# Patient Record
Sex: FEMALE | URBAN - METROPOLITAN AREA
[De-identification: names, ages, dates, MRNs, and addresses within clinical notes are randomized per-mention and may not be internally consistent; named-entity substitution may affect disease eponyms.]

---

## 2022-03-30 ENCOUNTER — OFFICE VISIT (OUTPATIENT)
Dept: URBAN - METROPOLITAN AREA CLINIC 56 | Facility: CLINIC | Age: 62
End: 2022-03-30
Payer: COMMERCIAL

## 2022-03-30 DIAGNOSIS — L30.9 DERMATITIS, UNSPECIFIED: Primary | ICD-10-CM

## 2022-03-30 DIAGNOSIS — H43.812 VITREOUS DEGENERATION, LEFT EYE: ICD-10-CM

## 2022-03-30 DIAGNOSIS — H25.13 AGE-RELATED NUCLEAR CATARACT, BILATERAL: ICD-10-CM

## 2022-03-30 PROCEDURE — 99204 OFFICE O/P NEW MOD 45 MIN: CPT | Performed by: STUDENT IN AN ORGANIZED HEALTH CARE EDUCATION/TRAINING PROGRAM

## 2022-03-30 RX ORDER — TRIAMCINOLONE ACETONIDE 5 MG/G
0.5 % OINTMENT TOPICAL
Qty: 15 | Refills: 0 | Status: ACTIVE
Start: 2022-03-30

## 2022-03-30 ASSESSMENT — KERATOMETRY
OD: 46.27
OS: 46.33

## 2022-03-30 ASSESSMENT — INTRAOCULAR PRESSURE
OS: 14
OD: 14

## 2022-03-30 ASSESSMENT — VISUAL ACUITY
OS: 20/20
OD: 20/20

## 2022-03-30 NOTE — IMPRESSION/PLAN
Impression: Dermatitis, unspecified: L30.9. Plan: BUL. Patient following with dermatology and has been on benadryl, Zyrtec, metronidazole cream and oral doxycycline. Improvement in symptoms around face, but pt feels eyelid redness and swelling is worsening. Looks c/w contact dermatitis but unknown irritant. Start triamcinolone 0.5% angy BID - TID BUL Start lid scrub QD Cont doxycycline 100mg PO - as directed by derm Cont metronidazole angy - as directed by derm Cont Zyrtec PO

F/u 2-3 weeks. Call if symptoms worsen. May consider consult with Dr. Sanjuana Montgomery if no improvement.  Cont care with dermatology